# Patient Record
Sex: MALE | Race: WHITE | NOT HISPANIC OR LATINO
[De-identification: names, ages, dates, MRNs, and addresses within clinical notes are randomized per-mention and may not be internally consistent; named-entity substitution may affect disease eponyms.]

---

## 2019-01-01 PROBLEM — Z00.00 ENCOUNTER FOR PREVENTIVE HEALTH EXAMINATION: Status: ACTIVE | Noted: 2019-01-01

## 2019-01-31 ENCOUNTER — APPOINTMENT (OUTPATIENT)
Dept: GASTROENTEROLOGY | Facility: CLINIC | Age: 74
End: 2019-01-31

## 2020-02-26 ENCOUNTER — TRANSCRIPTION ENCOUNTER (OUTPATIENT)
Age: 75
End: 2020-02-26

## 2020-02-26 ENCOUNTER — APPOINTMENT (OUTPATIENT)
Dept: GASTROENTEROLOGY | Facility: CLINIC | Age: 75
End: 2020-02-26
Payer: MEDICARE

## 2020-02-26 VITALS
OXYGEN SATURATION: 98 % | WEIGHT: 170 LBS | BODY MASS INDEX: 31.28 KG/M2 | HEIGHT: 62 IN | DIASTOLIC BLOOD PRESSURE: 80 MMHG | RESPIRATION RATE: 16 BRPM | SYSTOLIC BLOOD PRESSURE: 120 MMHG

## 2020-02-26 DIAGNOSIS — Z78.9 OTHER SPECIFIED HEALTH STATUS: ICD-10-CM

## 2020-02-26 DIAGNOSIS — I77.89 OTHER SPECIFIED DISORDERS OF ARTERIES AND ARTERIOLES: ICD-10-CM

## 2020-02-26 DIAGNOSIS — Z12.11 ENCOUNTER FOR SCREENING FOR MALIGNANT NEOPLASM OF COLON: ICD-10-CM

## 2020-02-26 PROCEDURE — 99202 OFFICE O/P NEW SF 15 MIN: CPT

## 2020-02-26 RX ORDER — SODIUM SULFATE, POTASSIUM SULFATE, MAGNESIUM SULFATE 17.5; 3.13; 1.6 G/ML; G/ML; G/ML
17.5-3.13-1.6 SOLUTION, CONCENTRATE ORAL
Qty: 1 | Refills: 0 | Status: ACTIVE | COMMUNITY
Start: 2020-02-26 | End: 1900-01-01

## 2020-02-26 RX ORDER — TERAZOSIN HCL 10 MG
10 TABLET ORAL
Refills: 0 | Status: ACTIVE | COMMUNITY

## 2020-02-26 NOTE — ASSESSMENT
[FreeTextEntry1] : The patient presents today for colonosocpy consult due to a history of colon polyps. He will be scheduled for a colonoscopy with Suprep. I have discussed the indications (including but not limited to ruling out inflammatory bowel disease, colorectal neoplasm, GI bleed, and AVM's), benefits, risks  (including but not limited to reaction to the anesthesia, infection, bleeding, and perforation),  and alternatives to colonoscopy with the patient. The patient understands all options and has agreed to have a colonoscopy and is medically optimized for the planned procedure. \par We will obtain his recent labs from his PCP, Dr. Murrieta.\par He will need to obtain cardiac clearance prior to the planned procedure.

## 2020-02-26 NOTE — PHYSICAL EXAM
[General Appearance - Alert] : alert [General Appearance - In No Acute Distress] : in no acute distress [PERRL With Normal Accommodation] : pupils were equal in size, round, and reactive to light [Sclera] : the sclera and conjunctiva were normal [Extraocular Movements] : extraocular movements were intact [Outer Ear] : the ears and nose were normal in appearance [Oropharynx] : the oropharynx was normal [Neck Appearance] : the appearance of the neck was normal [Neck Cervical Mass (___cm)] : no neck mass was observed [Jugular Venous Distention Increased] : there was no jugular-venous distention [Thyroid Diffuse Enlargement] : the thyroid was not enlarged [Thyroid Nodule] : there were no palpable thyroid nodules [Heart Rate And Rhythm] : heart rate was normal and rhythm regular [Auscultation Breath Sounds / Voice Sounds] : lungs were clear to auscultation bilaterally [Heart Sounds] : normal S1 and S2 [Heart Sounds Pericardial Friction Rub] : no pericardial rub [Heart Sounds Gallop] : no gallops [Murmurs] : no murmurs [Bowel Sounds] : normal bowel sounds [Abdomen Soft] : soft [Abdomen Tenderness] : non-tender [Abdomen Mass (___ Cm)] : no abdominal mass palpated [Abnormal Walk] : normal gait [Nail Clubbing] : no clubbing  or cyanosis of the fingernails [Musculoskeletal - Swelling] : no joint swelling seen [Skin Color & Pigmentation] : normal skin color and pigmentation [Motor Tone] : muscle strength and tone were normal [Skin Turgor] : normal skin turgor [Oriented To Time, Place, And Person] : oriented to person, place, and time [No Focal Deficits] : no focal deficits [Impaired Insight] : insight and judgment were intact [Affect] : the affect was normal [General Appearance - Well Developed] : well developed [General Appearance - Well Nourished] : well nourished [General Appearance - Well-Appearing] : healthy appearing [FreeTextEntry1] : Looks younger than stated age [] : normal voice and communication [Examination Of The Oral Cavity] : the lips and gums were normal [Hearing Threshold Finger Rub Not [place]] : hearing was normal [No CVA Tenderness] : no ~M costovertebral angle tenderness [No Spinal Tenderness] : no spinal tenderness [Motor Exam] : the motor exam was normal

## 2020-02-26 NOTE — HISTORY OF PRESENT ILLNESS
[Heartburn] : denies heartburn [Nausea] : denies nausea [Diarrhea] : denies diarrhea [Vomiting] : denies vomiting [Constipation] : denies constipation [Abdominal Pain] : denies abdominal pain [Yellow Skin Or Eyes (Jaundice)] : denies jaundice [Abdominal Swelling] : denies abdominal swelling [Rectal Pain] : denies rectal pain [_________] : Performed [unfilled] [de-identified] : 2 hyperplastic polyps [de-identified] : JANES LEE is a 75 year old male presenting today for colon cancer screening. His last colonoscopy was in 2011 and he had 2 diminutive rectal polyps removed that were hyperplastic only. He denies any family history of colon cancer or polyps. He  denies any nausea, vomiting, diarrhea, constipation, hematemesis, hematochezia, abdominal pain, bloating, unintended weight loss, decreased appetite, dysphagia or odynophagia. He moves his bowels regularly. His medical history includes an aortic dilation but he is unsure of the exact size, and hernia repair.

## 2020-02-26 NOTE — END OF VISIT
[FreeTextEntry3] : I also performed a full history and physical examination and discuss my assessment and plans with the nurse practitioner.

## 2020-03-31 ENCOUNTER — APPOINTMENT (OUTPATIENT)
Dept: GASTROENTEROLOGY | Facility: GI CENTER | Age: 75
End: 2020-03-31

## 2020-06-04 ENCOUNTER — TRANSCRIPTION ENCOUNTER (OUTPATIENT)
Age: 75
End: 2020-06-04

## 2020-12-23 PROBLEM — Z12.11 ENCOUNTER FOR SCREENING COLONOSCOPY: Status: RESOLVED | Noted: 2020-02-26 | Resolved: 2020-12-23

## 2021-12-13 ENCOUNTER — APPOINTMENT (OUTPATIENT)
Dept: GASTROENTEROLOGY | Facility: GI CENTER | Age: 76
End: 2021-12-13

## 2023-10-30 ENCOUNTER — OFFICE (OUTPATIENT)
Dept: URBAN - METROPOLITAN AREA CLINIC 113 | Facility: CLINIC | Age: 78
Setting detail: OPHTHALMOLOGY
End: 2023-10-30
Payer: MEDICARE

## 2023-10-30 DIAGNOSIS — H16.223: ICD-10-CM

## 2023-10-30 PROCEDURE — 92002 INTRM OPH EXAM NEW PATIENT: CPT | Performed by: OPTOMETRIST

## 2023-10-30 ASSESSMENT — VISUAL ACUITY
OS_BCVA: 20/20
OD_BCVA: 20/20

## 2023-10-30 ASSESSMENT — SUPERFICIAL PUNCTATE KERATITIS (SPK)
OS_SPK: ABSENT
OD_SPK: ABSENT

## 2023-10-30 ASSESSMENT — KERATOMETRY
OS_AXISANGLE_DEGREES: 171
OD_K1POWER_DIOPTERS: 43.50
OD_K2POWER_DIOPTERS: 44.25
OD_AXISANGLE_DEGREES: 034
OS_K2POWER_DIOPTERS: 44.75
OS_K1POWER_DIOPTERS: 42.00

## 2023-10-30 ASSESSMENT — REFRACTION_MANIFEST
OD_CYLINDER: -0.75
OS_VA1: 20/20-1
OD_ADD: +2.75
OS_SPHERE: +0.25
OU_VA: 20/20
OD_ADD: +2.75
OD_VA1: 20/20
OS_ADD: +2.75
OS_CYLINDER: -0.25
OS_AXIS: 020
OS_AXIS: 015
OD_SPHERE: +1.00
OS_SPHERE: +0.25
OS_CYLINDER: -0.25
OD_SPHERE: +0.75
OD_AXIS: 105
OS_ADD: +2.75
OD_CYLINDER: -0.50
OD_AXIS: 105
OD_VA1: 20/20
OS_VA1: 20/20

## 2023-10-30 ASSESSMENT — REFRACTION_CURRENTRX
OS_VPRISM_DIRECTION: PROGS
OD_OVR_VA: 20/
OS_SPHERE: +1.50
OS_OVR_VA: 20/
OS_OVR_VA: 20/
OS_CYLINDER: -2.25
OS_AXIS: 005
OS_ADD: +2.75
OD_VPRISM_DIRECTION: PROGS
OS_VPRISM_DIRECTION: SV
OD_AXIS: 083
OD_SPHERE: +1.00
OD_ADD: +2.75
OD_CYLINDER: -0.75
OS_SPHERE: +0.25
OS_AXIS: 086
OS_CYLINDER: -0.25
OD_CYLINDER: -4.00
OD_VPRISM_DIRECTION: SV
OD_OVR_VA: 20/
OD_SPHERE: +1.75
OD_AXIS: 109

## 2023-10-30 ASSESSMENT — AXIALLENGTH_DERIVED
OS_AL: 23.5891
OS_AL: 23.5891
OD_AL: 23.2167
OS_AL: 23.7361
OD_AL: 23.5516
OD_AL: 23.264

## 2023-10-30 ASSESSMENT — SPHEQUIV_DERIVED
OD_SPHEQUIV: 0.625
OD_SPHEQUIV: -0.25
OS_SPHEQUIV: 0.125
OS_SPHEQUIV: 0.125
OD_SPHEQUIV: 0.5
OS_SPHEQUIV: -0.25

## 2023-10-30 ASSESSMENT — REFRACTION_AUTOREFRACTION
OD_CYLINDER: -1.00
OS_SPHERE: +0.25
OD_AXIS: 119
OD_SPHERE: +0.25
OS_CYLINDER: -1.00
OS_AXIS: 130

## 2023-10-30 ASSESSMENT — TONOMETRY
OS_IOP_MMHG: 09
OD_IOP_MMHG: 10

## 2023-10-30 ASSESSMENT — TEAR BREAK UP TIME (TBUT)
OD_TBUT: 6SEC
OS_TBUT: 6SEC

## 2023-10-30 ASSESSMENT — CONFRONTATIONAL VISUAL FIELD TEST (CVF)
OS_FINDINGS: FULL
OD_FINDINGS: FULL

## 2024-01-04 ENCOUNTER — APPOINTMENT (OUTPATIENT)
Dept: GASTROENTEROLOGY | Facility: CLINIC | Age: 79
End: 2024-01-04
Payer: MEDICARE

## 2024-01-04 VITALS
BODY MASS INDEX: 31.47 KG/M2 | HEART RATE: 52 BPM | HEIGHT: 62 IN | WEIGHT: 171 LBS | DIASTOLIC BLOOD PRESSURE: 79 MMHG | SYSTOLIC BLOOD PRESSURE: 126 MMHG | OXYGEN SATURATION: 98 % | RESPIRATION RATE: 16 BRPM

## 2024-01-04 DIAGNOSIS — Z12.11 ENCOUNTER FOR SCREENING FOR MALIGNANT NEOPLASM OF COLON: ICD-10-CM

## 2024-01-04 DIAGNOSIS — K62.1 POLYP OF COLON: ICD-10-CM

## 2024-01-04 DIAGNOSIS — K63.5 POLYP OF COLON: ICD-10-CM

## 2024-01-04 PROCEDURE — 99203 OFFICE O/P NEW LOW 30 MIN: CPT

## 2024-01-04 RX ORDER — DIBASIC SODIUM PHOSPHATE, MONOBASIC POTASSIUM PHOSPHATE AND MONOBASIC SODIUM PHOSPHATE 852; 155; 130 MG/1; MG/1; MG/1
TABLET ORAL
Refills: 0 | Status: ACTIVE | COMMUNITY

## 2024-01-04 RX ORDER — DICLOFENAC SODIUM 75 MG/1
75 TABLET, DELAYED RELEASE ORAL
Refills: 0 | Status: ACTIVE | COMMUNITY

## 2024-01-04 RX ORDER — SODIUM SULFATE, POTASSIUM SULFATE AND MAGNESIUM SULFATE 1.6; 3.13; 17.5 G/177ML; G/177ML; G/177ML
17.5-3.13-1.6 SOLUTION ORAL
Qty: 1 | Refills: 0 | Status: ACTIVE | COMMUNITY
Start: 2024-01-04 | End: 1900-01-01

## 2024-01-04 RX ORDER — LEVOTHYROXINE SODIUM 0.05 MG/1
50 TABLET ORAL
Refills: 0 | Status: ACTIVE | COMMUNITY

## 2024-01-04 RX ORDER — METOPROLOL SUCCINATE 25 MG/1
25 TABLET, EXTENDED RELEASE ORAL
Refills: 0 | Status: DISCONTINUED | COMMUNITY
End: 2024-01-04

## 2024-01-04 RX ORDER — MIRABEGRON 50 MG/1
50 TABLET, FILM COATED, EXTENDED RELEASE ORAL
Refills: 0 | Status: ACTIVE | COMMUNITY

## 2024-01-04 RX ORDER — ALENDRONATE SODIUM 70 MG/1
70 TABLET ORAL
Refills: 0 | Status: ACTIVE | COMMUNITY

## 2024-01-04 NOTE — ASSESSMENT
[FreeTextEntry1] : At this time he is somewhat overdue for routine screening colonoscopy which will be scheduled.  He will obtain a CBC, basic metabolic profile and prothrombin time prior. The risks, benefits, complications and possible adverse consequences associated with colonoscopy were discussed with the patient.

## 2024-01-04 NOTE — HISTORY OF PRESENT ILLNESS
[FreeTextEntry1] : The patient last underwent a screening colonoscopy in 2011 at which time 2 diminutive hyperplastic polyps were removed from the rectum.  He had left-sided diverticulosis as well as internal hemorrhoids as well.  He returned in 2020 in order to schedule a follow-up surveillance exam but due to the pandemic the procedure was delayed until the current visit.  He returns today in order to reschedule a routine screening colonoscopy.  He denies any abdominal pain, nausea, vomiting, diarrhea or constipation.  There are no dyspeptic symptoms.  There is no blood per rectum.  His appetite and weight are stable.  Due to some social issues he will not be able to undergo the colonoscopy until April of this year.

## 2024-01-04 NOTE — PHYSICAL EXAM
[Alert] : alert [Normal Voice/Communication] : normal voice/communication [Healthy Appearing] : healthy appearing [No Acute Distress] : no acute distress [Sclera] : the sclera and conjunctiva were normal [Hearing Threshold Finger Rub Not [place]] : hearing was normal [Normal Lips/Gums] : the lips and gums were normal [Oropharynx] : the oropharynx was normal [Normal Appearance] : the appearance of the neck was normal [No Respiratory Distress] : no respiratory distress [No Acc Muscle Use] : no accessory muscle use [Respiration, Rhythm And Depth] : normal respiratory rhythm and effort [Auscultation Breath Sounds / Voice Sounds] : lungs were clear to auscultation bilaterally [Heart Rate And Rhythm] : heart rate was normal and rhythm regular [Normal S1, S2] : normal S1 and S2 [Murmurs] : no murmurs [Bowel Sounds] : normal bowel sounds [Abdomen Tenderness] : non-tender [No Masses] : no abdominal mass palpated [Abdomen Soft] : soft [] : no hepatosplenomegaly [Oriented To Time, Place, And Person] : oriented to person, place, and time

## 2024-04-25 ENCOUNTER — APPOINTMENT (OUTPATIENT)
Dept: GASTROENTEROLOGY | Facility: GI CENTER | Age: 79
End: 2024-04-25

## 2024-12-26 ENCOUNTER — RX RENEWAL (OUTPATIENT)
Age: 79
End: 2024-12-26

## 2025-03-12 ENCOUNTER — APPOINTMENT (OUTPATIENT)
Dept: ORTHOPEDIC SURGERY | Facility: CLINIC | Age: 80
End: 2025-03-12
Payer: MEDICARE

## 2025-03-12 VITALS — BODY MASS INDEX: 24.34 KG/M2 | HEIGHT: 70 IN | WEIGHT: 170 LBS

## 2025-03-12 DIAGNOSIS — M79.10 MYALGIA, UNSPECIFIED SITE: ICD-10-CM

## 2025-03-12 PROBLEM — M41.50 DEGENERATIVE SCOLIOSIS: Status: ACTIVE | Noted: 2025-03-12

## 2025-03-12 PROCEDURE — J3490M: CUSTOM | Mod: NC

## 2025-03-12 PROCEDURE — 20552 NJX 1/MLT TRIGGER POINT 1/2: CPT

## 2025-03-12 PROCEDURE — 72100 X-RAY EXAM L-S SPINE 2/3 VWS: CPT

## 2025-03-12 PROCEDURE — 99203 OFFICE O/P NEW LOW 30 MIN: CPT | Mod: 25

## 2025-03-12 RX ORDER — LOSARTAN POTASSIUM 25 MG/1
25 TABLET, FILM COATED ORAL
Refills: 0 | Status: ACTIVE | COMMUNITY

## 2025-03-12 RX ORDER — FINASTERIDE 5 MG/1
5 TABLET, FILM COATED ORAL
Refills: 0 | Status: ACTIVE | COMMUNITY

## 2025-03-14 PROBLEM — M54.16 LUMBAR RADICULOPATHY: Status: ACTIVE | Noted: 2025-03-14

## 2025-03-17 ENCOUNTER — APPOINTMENT (OUTPATIENT)
Dept: MRI IMAGING | Facility: CLINIC | Age: 80
End: 2025-03-17
Payer: MEDICARE

## 2025-03-17 PROCEDURE — 72148 MRI LUMBAR SPINE W/O DYE: CPT

## 2025-03-21 ENCOUNTER — APPOINTMENT (OUTPATIENT)
Dept: ORTHOPEDIC SURGERY | Facility: CLINIC | Age: 80
End: 2025-03-21
Payer: MEDICARE

## 2025-03-21 VITALS — HEIGHT: 70 IN | BODY MASS INDEX: 24.34 KG/M2 | WEIGHT: 170 LBS

## 2025-03-21 DIAGNOSIS — M41.50 OTHER SECONDARY SCOLIOSIS, SITE UNSPECIFIED: ICD-10-CM

## 2025-03-21 PROCEDURE — 99214 OFFICE O/P EST MOD 30 MIN: CPT

## 2025-03-21 RX ORDER — LIDOCAINE 5% 700 MG/1
5 PATCH TOPICAL
Qty: 30 | Refills: 3 | Status: ACTIVE | COMMUNITY
Start: 2025-03-21 | End: 1900-01-01

## 2025-03-28 ENCOUNTER — APPOINTMENT (OUTPATIENT)
Dept: PAIN MANAGEMENT | Facility: CLINIC | Age: 80
End: 2025-03-28
Payer: MEDICARE

## 2025-03-28 VITALS — HEIGHT: 70 IN | WEIGHT: 170 LBS | BODY MASS INDEX: 24.34 KG/M2

## 2025-03-28 DIAGNOSIS — M54.16 RADICULOPATHY, LUMBAR REGION: ICD-10-CM

## 2025-03-28 PROCEDURE — 99204 OFFICE O/P NEW MOD 45 MIN: CPT

## 2025-06-25 ENCOUNTER — APPOINTMENT (OUTPATIENT)
Dept: ORTHOPEDIC SURGERY | Facility: CLINIC | Age: 80
End: 2025-06-25

## 2025-07-25 ENCOUNTER — RX ONLY (RX ONLY)
Age: 80
End: 2025-07-25

## 2025-07-25 ENCOUNTER — OFFICE (OUTPATIENT)
Dept: URBAN - METROPOLITAN AREA CLINIC 113 | Facility: CLINIC | Age: 80
Setting detail: OPHTHALMOLOGY
End: 2025-07-25
Payer: MEDICARE

## 2025-07-25 DIAGNOSIS — H01.005: ICD-10-CM

## 2025-07-25 DIAGNOSIS — H35.372: ICD-10-CM

## 2025-07-25 DIAGNOSIS — H01.002: ICD-10-CM

## 2025-07-25 DIAGNOSIS — H16.223: ICD-10-CM

## 2025-07-25 PROCEDURE — 92014 COMPRE OPH EXAM EST PT 1/>: CPT | Performed by: OPHTHALMOLOGY

## 2025-07-25 ASSESSMENT — KERATOMETRY
OD_K2POWER_DIOPTERS: 44.25
OD_K1POWER_DIOPTERS: 43.50
OS_K2POWER_DIOPTERS: 44.75
OS_K1POWER_DIOPTERS: 42.00
OS_AXISANGLE_DEGREES: 171
OD_AXISANGLE_DEGREES: 034

## 2025-07-25 ASSESSMENT — REFRACTION_MANIFEST
OD_CYLINDER: -0.50
OD_VA1: 20/20
OD_ADD: +2.75
OS_AXIS: 015
OS_CYLINDER: -0.25
OS_ADD: +2.75
OU_VA: 20/20
OD_VA1: 20/20
OD_SPHERE: +1.00
OS_SPHERE: +0.25
OD_ADD: +2.75
OS_VA1: 20/20-1
OS_ADD: +2.75
OD_SPHERE: +0.75
OD_AXIS: 105
OS_CYLINDER: -0.25
OS_AXIS: 020
OS_VA1: 20/20
OD_AXIS: 105
OS_SPHERE: +0.25
OD_CYLINDER: -0.75

## 2025-07-25 ASSESSMENT — REFRACTION_AUTOREFRACTION
OD_SPHERE: +0.50
OS_SPHERE: PL
OS_CYLINDER: -0.75
OD_AXIS: 113
OD_CYLINDER: -1.50
OS_AXIS: 152

## 2025-07-25 ASSESSMENT — CONFRONTATIONAL VISUAL FIELD TEST (CVF)
OS_FINDINGS: FULL
OD_FINDINGS: FULL

## 2025-07-25 ASSESSMENT — REFRACTION_CURRENTRX
OD_AXIS: 083
OS_AXIS: 005
OS_ADD: +2.75
OD_OVR_VA: 20/
OS_VPRISM_DIRECTION: PROGS
OS_OVR_VA: 20/
OS_AXIS: 086
OS_CYLINDER: -0.25
OD_AXIS: 109
OD_ADD: +2.75
OS_CYLINDER: -2.25
OD_SPHERE: +1.75
OD_VPRISM_DIRECTION: SV
OD_CYLINDER: -4.00
OS_VPRISM_DIRECTION: SV
OD_OVR_VA: 20/
OD_CYLINDER: -0.75
OS_OVR_VA: 20/
OD_VPRISM_DIRECTION: PROGS
OD_SPHERE: +1.00
OS_SPHERE: +0.25
OS_SPHERE: +1.50

## 2025-07-25 ASSESSMENT — SUPERFICIAL PUNCTATE KERATITIS (SPK)
OD_SPK: ABSENT
OS_SPK: ABSENT

## 2025-07-25 ASSESSMENT — LID EXAM ASSESSMENTS
OS_BLEPHARITIS: LLL 1+
OD_BLEPHARITIS: RLL 1+
OS_COMMENTS: LID FLIPPED NO FB SEEN

## 2025-07-25 ASSESSMENT — VISUAL ACUITY
OS_BCVA: 20/25
OD_BCVA: 20/25+1

## 2025-07-25 ASSESSMENT — TEAR BREAK UP TIME (TBUT)
OS_TBUT: 6SEC
OD_TBUT: 6SEC

## 2025-08-22 ENCOUNTER — OFFICE (OUTPATIENT)
Dept: URBAN - METROPOLITAN AREA CLINIC 113 | Facility: CLINIC | Age: 80
Setting detail: OPHTHALMOLOGY
End: 2025-08-22
Payer: MEDICARE

## 2025-08-22 DIAGNOSIS — H16.222: ICD-10-CM

## 2025-08-22 DIAGNOSIS — H16.221: ICD-10-CM

## 2025-08-22 DIAGNOSIS — H43.811: ICD-10-CM

## 2025-08-22 DIAGNOSIS — H01.002: ICD-10-CM

## 2025-08-22 DIAGNOSIS — H43.392: ICD-10-CM

## 2025-08-22 DIAGNOSIS — H01.005: ICD-10-CM

## 2025-08-22 DIAGNOSIS — Z96.1: ICD-10-CM

## 2025-08-22 DIAGNOSIS — H35.372: ICD-10-CM

## 2025-08-22 DIAGNOSIS — H16.223: ICD-10-CM

## 2025-08-22 PROCEDURE — 83861 MICROFLUID ANALY TEARS: CPT | Mod: QW,LT | Performed by: OPHTHALMOLOGY

## 2025-08-22 PROCEDURE — 99213 OFFICE O/P EST LOW 20 MIN: CPT | Performed by: OPHTHALMOLOGY

## 2025-08-22 PROCEDURE — 92250 FUNDUS PHOTOGRAPHY W/I&R: CPT | Performed by: OPHTHALMOLOGY

## 2025-08-22 PROCEDURE — 83861 MICROFLUID ANALY TEARS: CPT | Mod: QW,RT | Performed by: OPHTHALMOLOGY

## 2025-08-22 ASSESSMENT — REFRACTION_MANIFEST
OD_SPHERE: +0.75
OS_AXIS: 020
OD_VA1: 20/20
OD_SPHERE: +1.00
OS_VA1: 20/20
OS_ADD: +2.75
OD_AXIS: 105
OS_CYLINDER: -0.25
OD_ADD: +2.75
OS_VA1: 20/20-1
OD_CYLINDER: -0.75
OD_ADD: +2.75
OS_ADD: +2.75
OD_VA1: 20/20
OU_VA: 20/20
OS_CYLINDER: -0.25
OD_AXIS: 105
OS_AXIS: 015
OS_SPHERE: +0.25
OD_CYLINDER: -0.50
OS_SPHERE: +0.25

## 2025-08-22 ASSESSMENT — REFRACTION_CURRENTRX
OD_OVR_VA: 20/
OD_SPHERE: +1.75
OD_SPHERE: +1.00
OS_AXIS: 005
OD_OVR_VA: 20/
OS_VPRISM_DIRECTION: PROGS
OS_SPHERE: +0.25
OS_OVR_VA: 20/
OD_CYLINDER: -0.75
OD_VPRISM_DIRECTION: SV
OS_CYLINDER: -0.25
OS_CYLINDER: -2.25
OD_CYLINDER: -4.00
OD_AXIS: 109
OS_ADD: +2.75
OS_VPRISM_DIRECTION: SV
OS_AXIS: 086
OD_VPRISM_DIRECTION: PROGS
OD_ADD: +2.75
OS_SPHERE: +1.50
OD_AXIS: 083
OS_OVR_VA: 20/

## 2025-08-22 ASSESSMENT — TEAR BREAK UP TIME (TBUT)
OS_TBUT: 6SEC
OD_TBUT: 6SEC

## 2025-08-22 ASSESSMENT — KERATOMETRY
OD_K1POWER_DIOPTERS: 43.50
OD_AXISANGLE_DEGREES: 034
OS_K1POWER_DIOPTERS: 42.00
OS_K2POWER_DIOPTERS: 44.75
OS_AXISANGLE_DEGREES: 171
OD_K2POWER_DIOPTERS: 44.25

## 2025-08-22 ASSESSMENT — LID EXAM ASSESSMENTS
OS_COMMENTS: LID FLIPPED NO FB SEEN
OD_BLEPHARITIS: RLL 1+
OS_BLEPHARITIS: LLL 1+

## 2025-08-22 ASSESSMENT — VISUAL ACUITY
OS_BCVA: 20/20-
OD_BCVA: 20/20

## 2025-08-22 ASSESSMENT — REFRACTION_AUTOREFRACTION
OD_SPHERE: +0.50
OD_AXIS: 113
OS_SPHERE: PL
OS_AXIS: 152
OS_CYLINDER: -0.75
OD_CYLINDER: -1.50

## 2025-08-22 ASSESSMENT — TONOMETRY: OS_IOP_MMHG: 10

## 2025-08-22 ASSESSMENT — SUPERFICIAL PUNCTATE KERATITIS (SPK)
OD_SPK: ABSENT
OS_SPK: ABSENT

## 2025-08-22 ASSESSMENT — CONFRONTATIONAL VISUAL FIELD TEST (CVF)
OS_FINDINGS: FULL
OD_FINDINGS: FULL